# Patient Record
Sex: MALE | Race: WHITE | NOT HISPANIC OR LATINO | Employment: FULL TIME | URBAN - METROPOLITAN AREA
[De-identification: names, ages, dates, MRNs, and addresses within clinical notes are randomized per-mention and may not be internally consistent; named-entity substitution may affect disease eponyms.]

---

## 2019-11-01 ENCOUNTER — APPOINTMENT (OUTPATIENT)
Dept: URGENT CARE | Age: 54
End: 2019-11-01

## 2019-11-01 ENCOUNTER — APPOINTMENT (OUTPATIENT)
Dept: LAB | Age: 54
End: 2019-11-01

## 2019-11-01 ENCOUNTER — TRANSCRIBE ORDERS (OUTPATIENT)
Dept: ADMINISTRATIVE | Facility: HOSPITAL | Age: 54
End: 2019-11-01

## 2019-11-01 DIAGNOSIS — Z13.9 ENCOUNTER FOR SCREENING, UNSPECIFIED: ICD-10-CM

## 2019-11-01 DIAGNOSIS — Z13.9 ENCOUNTER FOR SCREENING, UNSPECIFIED: Primary | ICD-10-CM

## 2019-11-01 LAB
CHOLEST SERPL-MCNC: 230 MG/DL (ref 50–200)
GLUCOSE P FAST SERPL-MCNC: 97 MG/DL (ref 65–99)
HDLC SERPL-MCNC: 47 MG/DL
LDLC SERPL CALC-MCNC: 159 MG/DL (ref 0–100)
NONHDLC SERPL-MCNC: 183 MG/DL
TRIGL SERPL-MCNC: 120 MG/DL

## 2019-11-01 PROCEDURE — 80323 ALKALOIDS NOS: CPT

## 2019-11-01 PROCEDURE — 80061 LIPID PANEL: CPT

## 2019-11-01 PROCEDURE — 36415 COLL VENOUS BLD VENIPUNCTURE: CPT

## 2019-11-01 PROCEDURE — 82947 ASSAY GLUCOSE BLOOD QUANT: CPT

## 2019-11-07 LAB
COTININE SERPL-MCNC: NORMAL NG/ML
NICOTINE SERPL-MCNC: NORMAL NG/ML

## 2023-03-02 ENCOUNTER — APPOINTMENT (EMERGENCY)
Dept: RADIOLOGY | Facility: HOSPITAL | Age: 58
End: 2023-03-02

## 2023-03-02 ENCOUNTER — HOSPITAL ENCOUNTER (EMERGENCY)
Facility: HOSPITAL | Age: 58
Discharge: HOME | End: 2023-03-02
Attending: EMERGENCY MEDICINE

## 2023-03-02 VITALS
BODY MASS INDEX: 28.06 KG/M2 | TEMPERATURE: 97.7 F | HEIGHT: 70 IN | OXYGEN SATURATION: 99 % | RESPIRATION RATE: 18 BRPM | SYSTOLIC BLOOD PRESSURE: 161 MMHG | DIASTOLIC BLOOD PRESSURE: 101 MMHG | HEART RATE: 90 BPM | WEIGHT: 196 LBS

## 2023-03-02 DIAGNOSIS — R07.81 RIB PAIN ON RIGHT SIDE: Primary | ICD-10-CM

## 2023-03-02 RX ORDER — NAPROXEN 500 MG/1
500 TABLET ORAL ONCE
Status: COMPLETED | OUTPATIENT
Start: 2023-03-02 | End: 2023-03-02

## 2023-03-02 RX ORDER — LIDOCAINE 50 MG/G
1 PATCH TOPICAL ONCE
Status: DISCONTINUED | OUTPATIENT
Start: 2023-03-02 | End: 2023-03-02 | Stop reason: HOSPADM

## 2023-03-02 RX ADMIN — NAPROXEN 500 MG: 500 TABLET ORAL at 16:04

## 2023-03-02 RX ADMIN — LIDOCAINE 1 PATCH: 50 PATCH TOPICAL at 16:05

## 2023-03-02 NOTE — ED PROVIDER NOTES
History  Chief Complaint   Patient presents with   • Rib Pain     Pt c/o right rib pain after leaning into a unit at work  Pt states he heard a "pop"  65 y o M w/ h/o HTN, presents to the ED due to right sided rib pain after leaning into device at work and hearing a popping sound  He was leaning over a container, trying to reach down to get something out of it and was putting a significant amount of his weight over the lip when he heard the pop and started having pain  He notes immediate, sharp pain over the lower right side of his ribs  It had associated nausea w/o vomiting and diaphoresis  He has broken multiple bones in the past and notes it isn't as bad as priors, but felt the same  He also had difficulty taking deep breaths due to pain  The symptoms improved over the past hour and at this time he endorses mild right lower chest wall pain  He denies radiation, continued sob, abd pain, numbness, weakness, tearing back pain, or other complaints a this time  He notes that his BP is generally well controlled and thinks that the pain and coming to the ED caused it to elevate  None       Past Medical History:   Diagnosis Date   • Hypertension        History reviewed  No pertinent surgical history  History reviewed  No pertinent family history  I have reviewed and agree with the history as documented  E-Cigarette/Vaping     E-Cigarette/Vaping Substances     Social History     Tobacco Use   • Smoking status: Never   • Smokeless tobacco: Never   Substance Use Topics   • Alcohol use: Yes     Comment: social   • Drug use: Never        Review of Systems   All other systems reviewed and are negative        Physical Exam  ED Triage Vitals [03/02/23 1518]   Temperature Pulse Respirations Blood Pressure SpO2   97 7 °F (36 5 °C) 90 18 (!) 161/101 99 %      Temp Source Heart Rate Source Patient Position - Orthostatic VS BP Location FiO2 (%)   Temporal Monitor Lying Right arm --      Pain Score       2 Orthostatic Vital Signs  Vitals:    03/02/23 1518   BP: (!) 161/101   Pulse: 90   Patient Position - Orthostatic VS: Lying       Physical Exam  Vitals and nursing note reviewed  Constitutional:       General: He is not in acute distress  Appearance: He is well-developed  HENT:      Head: Normocephalic and atraumatic  Eyes:      Conjunctiva/sclera: Conjunctivae normal    Cardiovascular:      Rate and Rhythm: Normal rate and regular rhythm  Heart sounds: No murmur heard  Pulmonary:      Effort: Pulmonary effort is normal  No respiratory distress  Breath sounds: Normal breath sounds  Chest:      Chest wall: Tenderness (Right inferior 2 ribs in mid-clavicular line   ) present  Abdominal:      Palpations: Abdomen is soft  Tenderness: There is no abdominal tenderness  Musculoskeletal:         General: No swelling  Cervical back: Neck supple  Skin:     General: Skin is warm and dry  Capillary Refill: Capillary refill takes less than 2 seconds  Neurological:      General: No focal deficit present  Mental Status: He is alert  Sensory: No sensory deficit  Motor: No weakness  Psychiatric:         Mood and Affect: Mood normal          ED Medications  Medications   lidocaine (LIDODERM) 5 % patch 1 patch (1 patch Topical Medication Applied 3/2/23 1605)   naproxen (NAPROSYN) tablet 500 mg (500 mg Oral Given 3/2/23 1604)       Diagnostic Studies  Results Reviewed     None                 XR chest 2 views   ED Interpretation by Sajan Gaming MD (03/02 1903)   No acute cardiopulmonary disease, no significant rib fractures            Procedures  Procedures      ED Course                             SBIRT 22yo+    Flowsheet Row Most Recent Value   SBIRT (25 yo +)    In order to provide better care to our patients, we are screening all of our patients for alcohol and drug use  Would it be okay to ask you these screening questions?  No Filed at: 03/02/2023 160 Medical Decision Making  65 y o M presenting to the ED with chest pain after hearing a pop while putting weight on the right lower ribs  Differential includes broken rib, cartilaginous injury, PTX, ACS, among others  Physical exam and history making emergent process like ACS or dissection highly unlikely at this time  Breath sounds equal making PTX unlikely  Will obtain CXR to evaluate for grossly fractured ribs or pulmonary injuries  Will treat symptomatically  X-ray reassuring showing no signs of pneumothorax, pulmonary contusion, displaced rib fractures, or other concerning findings  On reevaluation, patient able to ambulate, take deep breaths without significant pain  Discussed findings with patient who is agreeable with further outpatient care  Recommended home symptom management  Discharged with return precautions and work note  Rib pain on right side: acute illness or injury  Amount and/or Complexity of Data Reviewed  Radiology: ordered  Risk  Prescription drug management  Disposition  Final diagnoses:   Rib pain on right side     Time reflects when diagnosis was documented in both MDM as applicable and the Disposition within this note     Time User Action Codes Description Comment    3/2/2023  4:39 PM Sixto Brito Add [R07 81] Rib pain on right side       ED Disposition     ED Disposition   Discharge    Condition   Stable    Date/Time   Thu Mar 2, 2023  4:41 PM    Comment   Edna Galdamez discharge to home/self care  Follow-up Information     Follow up With Specialties Details Why Contact Info    Velma Daly PA-C Physician Assistant  As needed [address] [place] [address],[address] [facility] [id number] [phone]            There are no discharge medications for this patient  No discharge procedures on file  PDMP Review     None           ED Provider  Attending physically available and evaluated Loy Galdamez   I managed the patient along with the ED Attending      Electronically Signed by         Elian Delgado MD  03/02/23 0677

## 2023-03-02 NOTE — ED ATTENDING ATTESTATION
3/2/2023  Andrey ORDAZ DO, saw and evaluated the patient  I have discussed the patient with the resident/non-physician practitioner and agree with the resident's/non-physician practitioner's findings, Plan of Care, and MDM as documented in the resident's/non-physician practitioner's note, except where noted  All available labs and Radiology studies were reviewed  I was present for key portions of any procedure(s) performed by the resident/non-physician practitioner and I was immediately available to provide assistance  At this point I agree with the current assessment done in the Emergency Department  I have conducted an independent evaluation of this patient a history and physical is as follows:    65 yom with right lower rib pain after leaning over something metal at work  Severe  Now somewhat better  No sob  Denies assoc sx  On exam, htn  Clear lungs  Heart RRR     Tender Right 9-12 ribs    A/P: rib pain, trauma  Plan cxr to eval for pneumothorax, rib fracture  Given age, lack of comorbidities, likely does not ct to definitively diagnose rib fractures    ED Course         Critical Care Time  Procedures

## 2023-03-02 NOTE — DISCHARGE INSTRUCTIONS
Thank you for coming to the ED for your care  Your xray was reassuring that there was no damage to the lungs and no major displacement of any bones requiring further intervention  We recommend going to the pharmacy and purchasing lidocaine patches with menthol in them and using one every 12 hours as needed for pain  You can also take tylenol and ibuprofen every 6 hours to help with symptoms  Please monitor for any further symptoms and return to the ED if you develop difficulty breathing, fevers, productive cough, or other concerns

## 2023-03-02 NOTE — Clinical Note
Mona Sutton was seen and treated in our emergency department on 3/2/2023  Diagnosis:     Loy    He may return on this date: 03/11/2023    May return to work if symptoms improve  However, please allow for lighter duty including no heavy lifting for until after 3/11/23  If you have any questions or concerns, please don't hesitate to call        Goldie Augustine MD    ______________________________           _______________          _______________  Hospital Representative                              Date                                Time

## 2023-03-02 NOTE — Clinical Note
Katie Rizo was seen and treated in our emergency department on 3/2/2023  Diagnosis:     Loy    He may return on this date: 03/04/2023    May return to work if symptoms improve  If you have any questions or concerns, please don't hesitate to call        Kalpana Rosales MD    ______________________________           _______________          _______________  Hospital Representative                              Date                                Time